# Patient Record
Sex: FEMALE | Employment: UNEMPLOYED | URBAN - METROPOLITAN AREA
[De-identification: names, ages, dates, MRNs, and addresses within clinical notes are randomized per-mention and may not be internally consistent; named-entity substitution may affect disease eponyms.]

---

## 2024-05-06 ENCOUNTER — TELEPHONE (OUTPATIENT)
Dept: INTERNAL MEDICINE CLINIC | Facility: CLINIC | Age: 44
End: 2024-05-06

## 2024-05-06 NOTE — TELEPHONE ENCOUNTER
*Late Entry*    Patient is scheduled for a New patient visit with Dr. Mary Jane Thrasher on 5/7. Patient came in to the office on 5/2 to visit to fill out a release of info form.    JOSE J faxed, confirmed, and scanned

## 2024-05-07 ENCOUNTER — APPOINTMENT (OUTPATIENT)
Dept: LAB | Facility: CLINIC | Age: 44
End: 2024-05-07
Payer: COMMERCIAL

## 2024-05-07 ENCOUNTER — OFFICE VISIT (OUTPATIENT)
Dept: INTERNAL MEDICINE CLINIC | Facility: CLINIC | Age: 44
End: 2024-05-07

## 2024-05-07 ENCOUNTER — TELEPHONE (OUTPATIENT)
Dept: INTERNAL MEDICINE CLINIC | Facility: CLINIC | Age: 44
End: 2024-05-07

## 2024-05-07 VITALS
TEMPERATURE: 98.6 F | OXYGEN SATURATION: 100 % | SYSTOLIC BLOOD PRESSURE: 111 MMHG | HEART RATE: 72 BPM | DIASTOLIC BLOOD PRESSURE: 66 MMHG | WEIGHT: 135.6 LBS

## 2024-05-07 DIAGNOSIS — Z86.16 HISTORY OF COVID-19: ICD-10-CM

## 2024-05-07 DIAGNOSIS — G89.29 MID BACK PAIN, CHRONIC: ICD-10-CM

## 2024-05-07 DIAGNOSIS — R42 DIZZINESS: ICD-10-CM

## 2024-05-07 DIAGNOSIS — R79.89 ABNORMAL TSH: ICD-10-CM

## 2024-05-07 DIAGNOSIS — Z83.3 FAMILY HISTORY OF DIABETES MELLITUS: ICD-10-CM

## 2024-05-07 DIAGNOSIS — R39.9 URINARY SYMPTOM OR SIGN: ICD-10-CM

## 2024-05-07 DIAGNOSIS — R39.9 URINARY SYMPTOM OR SIGN: Primary | ICD-10-CM

## 2024-05-07 DIAGNOSIS — Z86.39 HISTORY OF HYPERTHYROIDISM: ICD-10-CM

## 2024-05-07 DIAGNOSIS — M54.9 MID BACK PAIN, CHRONIC: ICD-10-CM

## 2024-05-07 DIAGNOSIS — R73.03 PRE-DIABETES: ICD-10-CM

## 2024-05-07 DIAGNOSIS — N94.9 VAGINAL SYMPTOM: ICD-10-CM

## 2024-05-07 PROBLEM — H53.8 BLURRED VISION: Status: ACTIVE | Noted: 2024-05-07

## 2024-05-07 LAB
ALBUMIN SERPL BCP-MCNC: 4.4 G/DL (ref 3.5–5)
ALP SERPL-CCNC: 54 U/L (ref 34–104)
ALT SERPL W P-5'-P-CCNC: 21 U/L (ref 7–52)
ANION GAP SERPL CALCULATED.3IONS-SCNC: 10 MMOL/L (ref 4–13)
AST SERPL W P-5'-P-CCNC: 20 U/L (ref 13–39)
BACTERIA UR QL AUTO: ABNORMAL /HPF
BASOPHILS # BLD AUTO: 0.04 THOUSANDS/ÂΜL (ref 0–0.1)
BASOPHILS NFR BLD AUTO: 1 % (ref 0–1)
BILIRUB SERPL-MCNC: 0.89 MG/DL (ref 0.2–1)
BILIRUB UR QL STRIP: NEGATIVE
BUDDING YEAST: PRESENT
BUN SERPL-MCNC: 5 MG/DL (ref 5–25)
CALCIUM SERPL-MCNC: 8.8 MG/DL (ref 8.4–10.2)
CHLORIDE SERPL-SCNC: 105 MMOL/L (ref 96–108)
CLARITY UR: CLEAR
CO2 SERPL-SCNC: 25 MMOL/L (ref 21–32)
COLOR UR: COLORLESS
CREAT SERPL-MCNC: 0.43 MG/DL (ref 0.6–1.3)
EOSINOPHIL # BLD AUTO: 0.02 THOUSAND/ÂΜL (ref 0–0.61)
EOSINOPHIL NFR BLD AUTO: 0 % (ref 0–6)
ERYTHROCYTE [DISTWIDTH] IN BLOOD BY AUTOMATED COUNT: 12.6 % (ref 11.6–15.1)
EST. AVERAGE GLUCOSE BLD GHB EST-MCNC: 114 MG/DL
GFR SERPL CREATININE-BSD FRML MDRD: 126 ML/MIN/1.73SQ M
GLUCOSE P FAST SERPL-MCNC: 87 MG/DL (ref 65–99)
GLUCOSE UR STRIP-MCNC: NEGATIVE MG/DL
HBA1C MFR BLD: 5.6 %
HCT VFR BLD AUTO: 41.9 % (ref 34.8–46.1)
HGB BLD-MCNC: 13.7 G/DL (ref 11.5–15.4)
HGB UR QL STRIP.AUTO: NEGATIVE
HYALINE CASTS #/AREA URNS LPF: ABNORMAL /LPF
IMM GRANULOCYTES # BLD AUTO: 0 THOUSAND/UL (ref 0–0.2)
IMM GRANULOCYTES NFR BLD AUTO: 0 % (ref 0–2)
KETONES UR STRIP-MCNC: NEGATIVE MG/DL
LEUKOCYTE ESTERASE UR QL STRIP: NEGATIVE
LYMPHOCYTES # BLD AUTO: 2.22 THOUSANDS/ÂΜL (ref 0.6–4.47)
LYMPHOCYTES NFR BLD AUTO: 42 % (ref 14–44)
MCH RBC QN AUTO: 30.1 PG (ref 26.8–34.3)
MCHC RBC AUTO-ENTMCNC: 32.7 G/DL (ref 31.4–37.4)
MCV RBC AUTO: 92 FL (ref 82–98)
MONOCYTES # BLD AUTO: 0.47 THOUSAND/ÂΜL (ref 0.17–1.22)
MONOCYTES NFR BLD AUTO: 9 % (ref 4–12)
NEUTROPHILS # BLD AUTO: 2.48 THOUSANDS/ÂΜL (ref 1.85–7.62)
NEUTS SEG NFR BLD AUTO: 48 % (ref 43–75)
NITRITE UR QL STRIP: NEGATIVE
NON-SQ EPI CELLS URNS QL MICRO: ABNORMAL /HPF
NRBC BLD AUTO-RTO: 0 /100 WBCS
PH UR STRIP.AUTO: 6.5 [PH]
PLATELET # BLD AUTO: 191 THOUSANDS/UL (ref 149–390)
PMV BLD AUTO: 13.1 FL (ref 8.9–12.7)
POTASSIUM SERPL-SCNC: 3.3 MMOL/L (ref 3.5–5.3)
PROT SERPL-MCNC: 7.1 G/DL (ref 6.4–8.4)
PROT UR STRIP-MCNC: NEGATIVE MG/DL
RBC # BLD AUTO: 4.55 MILLION/UL (ref 3.81–5.12)
RBC #/AREA URNS AUTO: ABNORMAL /HPF
SODIUM SERPL-SCNC: 140 MMOL/L (ref 135–147)
SP GR UR STRIP.AUTO: 1 (ref 1–1.03)
TSH SERPL DL<=0.05 MIU/L-ACNC: 0.98 UIU/ML (ref 0.45–4.5)
UROBILINOGEN UR STRIP-ACNC: <2 MG/DL
WBC # BLD AUTO: 5.23 THOUSAND/UL (ref 4.31–10.16)
WBC #/AREA URNS AUTO: ABNORMAL /HPF

## 2024-05-07 PROCEDURE — 36415 COLL VENOUS BLD VENIPUNCTURE: CPT

## 2024-05-07 PROCEDURE — 84443 ASSAY THYROID STIM HORMONE: CPT

## 2024-05-07 PROCEDURE — 81001 URINALYSIS AUTO W/SCOPE: CPT

## 2024-05-07 PROCEDURE — 80053 COMPREHEN METABOLIC PANEL: CPT

## 2024-05-07 PROCEDURE — 83036 HEMOGLOBIN GLYCOSYLATED A1C: CPT

## 2024-05-07 PROCEDURE — 99204 OFFICE O/P NEW MOD 45 MIN: CPT | Performed by: FAMILY MEDICINE

## 2024-05-07 PROCEDURE — 85025 COMPLETE CBC W/AUTO DIFF WBC: CPT

## 2024-05-07 PROCEDURE — 87086 URINE CULTURE/COLONY COUNT: CPT

## 2024-05-07 NOTE — ASSESSMENT & PLAN NOTE
Pt had dx age 22 , she took a medication for a short time can't remember what and was told to drink Ensure q day , recent tsh 0.347 , recheck labs

## 2024-05-07 NOTE — PROGRESS NOTES
Name: Melody Ramirez      : 1980      MRN: [id number]  Encounter Provider: Mary Jane Mayo MD  Encounter Date: 2024   Encounter department: [facility]    Assessment & Plan     1. Urinary symptom or sign  Assessment & Plan:  See detailed HPI , pt had developed urinary frequency ~ she had COVID 2023 , then sometime in 2023 dev urinary frequency , urgency , vaginal itching saw gyn , had testing she was given med for fungal infection and erythromycin. She did feel better but within the last weeks having urinary sx and vag itching again , - vag d/c Labs from 2023 UA neg . She has been drinking a lot of water , continue same take the time to urinate  , check f/u cmp , hgba1c and ua     Orders:  -     Urinalysis with microscopic; Future    2. Dizziness  Assessment & Plan:  See HPI , sx occur only after eating certain foods heavier , sweet , when she gets up and walks around feels better , no sx with exertion at night , avoid offending positions ,  actions we reviewed healthy diet , low carb , portion control , drinking enough water     Orders:  -     CBC and differential; Future    3. Vaginal symptom    4. Mid back pain, chronic  Assessment & Plan:  Pt was in an accident when a child had a surgery , she has had bilat mid back pain at times since , will discuss this further next visit       5. Abnormal TSH  -     TSH, 3rd generation with Free T4 reflex; Future    6. History of COVID-19    7. Family history of diabetes mellitus  -     Hemoglobin A1C; Future    8. Pre-diabetes  -     Comprehensive metabolic panel; Future    9. 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 She had eye exam , told she needed reading glasses 125 . She was having urinary frequency and felt tired , she started taking vitamins . She had vaginal infection Nov 2023, used otc med at home , had another infection then saw gyn , she had pap , given script for fungal infection , also took erythromycin pills . She was also having urinary urgency , bad taste in mouth and thirsty . She was having mid back pain bilateral back pain she was in an accident when had to have surgery   She has been trying to watch her diet , she has been having urinary sx again and itching in the vagina . She feels dizzy after eating certain things December 2023 , bread and butter and jelly , head can spin she needs to drink water or walk yesterday she had sushi and broccoli . Felt dizzy and also feels gas , she got up and walked and that helped   Pt has hx hyperthyroidism age 22 , she took a med short time , and she was to drink Ensure every day   Pt has labs from [place] with her she had visit there 12/23/23 and had labs   Review of Systems   Constitutional:  Negative for chills and fever.   HENT:  Negative for ear pain and sore throat.    Eyes:  Negative for pain and visual disturbance.        Blurred vision    Respiratory:  Negative for cough and shortness of breath.    Cardiovascular:  Negative for chest pain and palpitations.   Gastrointestinal:  Negative for abdominal pain and vomiting.        Gerd   Genitourinary:  Positive for frequency. Negative for dysuria and hematuria.        Vaginal itching   Musculoskeletal:  Positive for back pain. Negative for arthralgias.   Skin:  Negative for color change and rash.   Neurological:  Positive for dizziness. Negative for seizures and syncope.   All other systems reviewed and are negative.      History reviewed. No  pertinent past medical history.  Past Surgical History:   Procedure Laterality Date    BACK SURGERY       SECTION       Family History   Problem Relation Age of Onset    Kidney disease Mother     No Known Problems Father     No Known Problems Sister     No Known Problems Sister     No Known Problems Brother     No Known Problems Daughter     No Known Problems Daughter      Social History     Socioeconomic History    Marital status: /Civil Union     Spouse name: None    Number of children: None    Years of education: None    Highest education level: None   Occupational History    None   Tobacco Use    Smoking status: Never    Smokeless tobacco: Never   Vaping Use    Vaping status: Never Used   Substance and Sexual Activity    Alcohol use: Never    Drug use: Never    Sexual activity: None   Other Topics Concern    None   Social History Narrative    None     Social Determinants of Health     Financial Resource Strain: Low Risk  (2024)    Overall Financial Resource Strain (CARDIA)     Difficulty of Paying Living Expenses: Not hard at all   Food Insecurity: No Food Insecurity (2024)    Hunger Vital Sign     Worried About Running Out of Food in the Last Year: Never true     Ran Out of Food in the Last Year: Never true   Transportation Needs: No Transportation Needs (2024)    PRAPARE - Transportation     Lack of Transportation (Medical): No     Lack of Transportation (Non-Medical): No   Physical Activity: Not on file   Stress: Not on file   Social Connections: Not on file   Intimate Partner Violence: Not on file   Housing Stability: Low Risk  (2024)    Housing Stability Vital Sign     Unable to Pay for Housing in the Last Year: No     Number of Places Lived in the Last Year: 1     Unstable Housing in the Last Year: No     No current outpatient medications on file prior to visit.     Not on File    There is no immunization history on file for this patient.    Objective     /66 (BP  Location: Right arm, Patient Position: Sitting, Cuff Size: Standard)   Pulse 72   Temp 98.6 °F (37 °C) (Temporal)   Wt 61.5 kg (135 lb 9.6 oz)   SpO2 100%     Physical Exam  Constitutional:       Comments: Skin with good color turgor , well hydrated ,no distress noted     HENT:      Head: Normocephalic and atraumatic.      Right Ear: No decreased hearing noted. No middle ear effusion. There is no impacted cerumen.      Left Ear: No decreased hearing noted.  No middle ear effusion. There is no impacted cerumen.      Nose: No congestion or rhinorrhea.      Mouth/Throat:      Pharynx: Oropharynx is clear.   Eyes:      Extraocular Movements: Extraocular movements intact.   Neck:      Thyroid: No thyromegaly.   Cardiovascular:      Rate and Rhythm: Normal rate and regular rhythm.      Heart sounds: Normal heart sounds.   Pulmonary:      Breath sounds: Normal breath sounds.   Abdominal:      Tenderness: There is no abdominal tenderness. There is no right CVA tenderness, left CVA tenderness, guarding or rebound.   Lymphadenopathy:      Cervical:      Right cervical: No superficial cervical adenopathy.     Left cervical: No superficial cervical adenopathy.   Skin:     General: Skin is warm and dry.   Neurological:      Comments: Non focal exam    Psychiatric:         Attention and Perception: Attention normal.         Mood and Affect: Mood normal.         Speech: Speech normal.         Behavior: Behavior normal.       Mary Jane Mayo MD

## 2024-05-07 NOTE — ASSESSMENT & PLAN NOTE
Sx date back to after she had COVID Oct 2023 , she had eye exam and labs Eye doctor told her she needed reading glasses 125 , she has been using them , still can feel she has blurry vision , hgba1c 6.4 , + fam hx DM , rec f/u labs and f/u with optometrist

## 2024-05-07 NOTE — ASSESSMENT & PLAN NOTE
See HPI , sx occur only after eating certain foods heavier , sweet , when she gets up and walks around feels better , no sx with exertion at night , avoid offending positions ,  actions we reviewed healthy diet , low carb , portion control , drinking enough water

## 2024-05-07 NOTE — ASSESSMENT & PLAN NOTE
Pt was in an accident when a child had a surgery , she has had bilat mid back pain at times since , will discuss this further next visit

## 2024-05-07 NOTE — ASSESSMENT & PLAN NOTE
See detailed HPI , pt had developed urinary frequency ~ she had COVID October 2023 , then sometime in Nov 2023 dev urinary frequency , urgency , vaginal itching saw gyn , had testing she was given med for fungal infection and erythromycin. She did feel better but within the last weeks having urinary sx and vag itching again , - vag d/c Labs from 12/2023 UA neg . She has been drinking a lot of water , continue same take the time to urinate  , check f/u cmp , hgba1c and ua

## 2024-05-08 ENCOUNTER — NURSE TRIAGE (OUTPATIENT)
Age: 44
End: 2024-05-08

## 2024-05-08 ENCOUNTER — APPOINTMENT (OUTPATIENT)
Dept: LAB | Facility: CLINIC | Age: 44
End: 2024-05-08

## 2024-05-08 DIAGNOSIS — R39.9 URINARY SYMPTOM OR SIGN: Primary | ICD-10-CM

## 2024-05-08 DIAGNOSIS — R39.9 URINARY SYMPTOM OR SIGN: ICD-10-CM

## 2024-05-08 NOTE — TELEPHONE ENCOUNTER
Patient called back and I scheduled her next week with Dr Franco.  She does not need a call back unless you think she should be seen sooner. Thank you

## 2024-05-08 NOTE — TELEPHONE ENCOUNTER
"Patient called, would like to establish care for vaginal dryness and itching. Appointment scheduled for 5/9/24.       Reason for Disposition  • Patient wants to be seen    Answer Assessment - Initial Assessment Questions  1. SYMPTOM: \"What's the main symptom you're concerned about?\" (e.g., pain, itching, dryness)      Vaginal itching, dryness,   2. LOCATION: \"Where is the  symptoms located?\" (e.g., inside/outside, left/right)      Vagina     5. ITCHING: \"Is there any itching?\" If Yes, ask: \"How bad is it?\" (Scale: 1-10; mild, moderate, severe)      Mild   6. CAUSE: \"What do you think is causing the discharge?\" \"Have you had the same problem before? What happened then?\"      Unknown   7. OTHER SYMPTOMS: \"Do you have any other symptoms?\" (e.g., fever, itching, vaginal bleeding, pain with urination, injury to genital area, vaginal foreign body)      See note.    Protocols used: Vaginal Symptoms-ADULT-[place]    "

## 2024-05-08 NOTE — TELEPHONE ENCOUNTER
Patient called back and I tried to schedule her an appointment and she only wants to see a female and a doctor.  I do not see anything this week.  Please call her back at [phone] . Thank you

## 2024-05-10 LAB — BACTERIA UR CULT: NORMAL

## 2024-05-13 ENCOUNTER — TELEPHONE (OUTPATIENT)
Dept: INTERNAL MEDICINE CLINIC | Facility: CLINIC | Age: 44
End: 2024-05-13

## 2024-05-13 NOTE — TELEPHONE ENCOUNTER
Received call from patient. Introduced self and role. Patient updated her appointment is scheduled for tomorrow with Dr. Hinton at 1520. Patient updated physician will review most recent lab work with her tomorrow during appointment. All questions/concerns answered at this time.

## 2024-05-14 ENCOUNTER — OFFICE VISIT (OUTPATIENT)
Dept: INTERNAL MEDICINE CLINIC | Facility: CLINIC | Age: 44
End: 2024-05-14

## 2024-05-14 VITALS
HEIGHT: 66 IN | BODY MASS INDEX: 21.08 KG/M2 | SYSTOLIC BLOOD PRESSURE: 93 MMHG | DIASTOLIC BLOOD PRESSURE: 60 MMHG | OXYGEN SATURATION: 99 % | HEART RATE: 70 BPM | TEMPERATURE: 97.8 F | WEIGHT: 131.19 LBS | RESPIRATION RATE: 18 BRPM

## 2024-05-14 DIAGNOSIS — R10.9 LEFT FLANK PAIN: ICD-10-CM

## 2024-05-14 DIAGNOSIS — Z86.39 HISTORY OF HYPERTHYROIDISM: ICD-10-CM

## 2024-05-14 DIAGNOSIS — R39.9 URINARY SYMPTOM OR SIGN: Primary | ICD-10-CM

## 2024-05-14 DIAGNOSIS — N94.9 VAGINAL SYMPTOM: ICD-10-CM

## 2024-05-14 DIAGNOSIS — E87.6 HYPOKALEMIA: ICD-10-CM

## 2024-05-14 DIAGNOSIS — R73.03 PRE-DIABETES: ICD-10-CM

## 2024-05-14 DIAGNOSIS — R82.90 ABNORMAL URINALYSIS: ICD-10-CM

## 2024-05-14 PROCEDURE — 99214 OFFICE O/P EST MOD 30 MIN: CPT | Performed by: FAMILY MEDICINE

## 2024-05-14 NOTE — ASSESSMENT & PLAN NOTE
See detailed HPI , pt had developed urinary frequency , urgency and vaginal itching in November 2023 ( she had COVID month prior ) she saw gyn had exam , testing she was given medication antifungal and azithromycin , her  had to take meds as well She felt better for a time then sx came back a few weeks ago . Recent urine cx < 10 k colonies contaminants , + sm hyaline casts She had no hx of UTI or other urinary or vaginitis issue , She has noted L flank pain since Dec 2023 , this happens after she drinks water and also occurs when she is laying down , the pain can radiate around to L lower ribs upper abdomen . She has had no fever , has been more diligent with drinking more water , eating more healthy diet . No fam hx of urinary dz , - kidney stones   Rec she drink plenty of water , add probiotic Kefir , 6-9 oz po q day , check urine cx in 2 weeks if she still has  sx , schedule retroperitoneal US , f/u here in 4 weeks

## 2024-05-14 NOTE — ASSESSMENT & PLAN NOTE
Pt continued to have vaginal itching no d/c local care as she has been she will begin probiotic q day , she has appt with gyn scheduled in July 2024

## 2024-05-14 NOTE — PROGRESS NOTES
Name: Melody Mayorga      : 1980      MRN: [id number]  Encounter Provider: Mary Jane Mayo MD  Encounter Date: 2024   Encounter department: [facility]    Assessment & Plan     1. Urinary symptom or sign  Assessment & Plan:  See detailed HPI , pt had developed urinary frequency , urgency and vaginal itching in 2023 ( she had COVID month prior ) she saw gyn had exam , testing she was given medication antifungal and azithromycin , her  had to take meds as well She felt better for a time then sx came back a few weeks ago . Recent urine cx < 10 k colonies contaminants , + sm hyaline casts She had no hx of UTI or other urinary or vaginitis issue , She has noted L flank pain since Dec 2023 , this happens after she drinks water and also occurs when she is laying down , the pain can radiate around to L lower ribs upper abdomen . She has had no fever , has been more diligent with drinking more water , eating more healthy diet . No fam hx of urinary dz , - kidney stones   Rec she drink plenty of water , add probiotic Kefir , 6-9 oz po q day , check urine cx in 2 weeks if she still has  sx , schedule retroperitoneal US , f/u here in 4 weeks     Orders:  -     US kidney and bladder with pvr; Future; Expected date: 2024  -     UA/M w/rflx Culture, Routine; Future; Expected date: 2024    2. History of hyperthyroidism  Assessment & Plan:  Recent tsh 0.980      3. Vaginal symptom  Assessment & Plan:  Pt continued to have vaginal itching no d/c local care as she has been she will begin probiotic q day , she has appt with gyn scheduled in 2024      4. Hypokalemia    5. Left flank pain  -     US kidney and bladder with pvr; Future; Expected date: 2024    6. Abnormal urinalysis  -     US kidney and bladder with pvr; Future; Expected date: 2024  -     UA/M w/rflx Culture, Routine; Future; Expected date: 2024    7.  "Pre-diabetes  Assessment & Plan:  Low carb diet , exercise recent hgba1c 5.6 , last in Dec 2024 was 6.4             Subjective      HPIPt here for f/u appt to last weeks visit , she had COVID 10/2023 , this was a  + stressful time , her daughters had it as well one had pneumonia , pt  was so worried about her wasn't eating well , then in November she developed urinary sx frequency , urgency vaginal itching saw gyn , had testing given antifungal med and erythromycin . Her  had to take meds as well She did feel better then the last few weeks sx started again , labs from 12/23 hgba1c elevated - vag dc , I had her go for urinalysis and cx vaginal itching urinary sx , Urine cx < 10 k colonies  mixed contaminants , hyaline  casts small , hgba1c 5.6 , cmp nl , cbc nl , tsh nl hgba1c   Pt gets pain in L side of back when she drinks water started in December 2023 , also would feel this when she laid down at night , pain can radiate around to L lowe rribs , L upper abdomen Pt has been eating less meat , she drinks decaf coffee and cinnamon tea   She has not had UTI in the past , Mom has kidney \" sand \"  She has been eating less meat   Review of Systems   Constitutional:  Negative for chills and fever.   HENT:  Negative for ear pain and sore throat.    Eyes:  Negative for pain and visual disturbance.   Respiratory:  Negative for cough and shortness of breath.    Cardiovascular:  Negative for chest pain and palpitations.   Gastrointestinal:  Negative for abdominal pain and vomiting.   Genitourinary:  Positive for frequency. Negative for dysuria and hematuria.        Vaginal itching    Musculoskeletal:  Positive for back pain. Negative for arthralgias.   Skin:  Negative for color change and rash.   Neurological:  Negative for seizures and syncope.   All other systems reviewed and are negative.      No current outpatient medications on file prior to visit.       Objective     BP 93/60 (BP Location: Left arm, Patient " "Position: Sitting, Cuff Size: Standard)   Pulse 70   Temp 97.8 °F (36.6 °C) (Temporal)   Resp 18   Ht 5' 6\" (1.676 m)   Wt 59.5 kg (131 lb 3 oz)   SpO2 99%   BMI 21.17 kg/m²     Physical Exam  Constitutional:       Comments: Skin with good color turgor , well hydrated ,no distress noted     Neck:      Thyroid: No thyromegaly.   Cardiovascular:      Rate and Rhythm: Normal rate and regular rhythm.      Heart sounds: Normal heart sounds.   Pulmonary:      Breath sounds: Normal breath sounds.   Abdominal:      Tenderness: There is no abdominal tenderness. There is no right CVA tenderness, left CVA tenderness, guarding or rebound.   Musculoskeletal:      Thoracic back: Normal range of motion. No scoliosis.   Lymphadenopathy:      Cervical:      Right cervical: No superficial cervical adenopathy.     Left cervical: No superficial cervical adenopathy.   Skin:     General: Skin is warm and dry.   Neurological:      Comments: Non focal exam    Psychiatric:         Attention and Perception: Attention normal.         Mood and Affect: Mood normal.         Speech: Speech normal.         Behavior: Behavior normal.       Mary Jane Mayo MD    "

## 2024-06-17 ENCOUNTER — HOSPITAL ENCOUNTER (OUTPATIENT)
Dept: RADIOLOGY | Facility: HOSPITAL | Age: 44
Discharge: HOME | End: 2024-06-17
Attending: FAMILY MEDICINE
Payer: COMMERCIAL

## 2024-06-17 DIAGNOSIS — R82.90 ABNORMAL URINALYSIS: ICD-10-CM

## 2024-06-17 DIAGNOSIS — R10.9 LEFT FLANK PAIN: ICD-10-CM

## 2024-06-17 DIAGNOSIS — R39.9 URINARY SYMPTOM OR SIGN: ICD-10-CM

## 2024-06-17 PROCEDURE — 76770 US EXAM ABDO BACK WALL COMP: CPT

## 2024-06-18 DIAGNOSIS — R39.9 URINARY SYMPTOM OR SIGN: ICD-10-CM

## 2024-06-18 DIAGNOSIS — R93.41 ABNORMAL ULTRASOUND OF BLADDER: Primary | ICD-10-CM

## 2024-06-18 DIAGNOSIS — R82.90 ABNORMAL URINALYSIS: ICD-10-CM

## 2024-09-24 ENCOUNTER — TELEPHONE (OUTPATIENT)
Dept: INTERNAL MEDICINE CLINIC | Facility: CLINIC | Age: 44
End: 2024-09-24